# Patient Record
Sex: MALE | Race: BLACK OR AFRICAN AMERICAN | NOT HISPANIC OR LATINO | URBAN - METROPOLITAN AREA
[De-identification: names, ages, dates, MRNs, and addresses within clinical notes are randomized per-mention and may not be internally consistent; named-entity substitution may affect disease eponyms.]

---

## 2021-12-08 ENCOUNTER — RESEARCH ENCOUNTER (OUTPATIENT)
Dept: RESEARCH | Facility: HOSPITAL | Age: 61
End: 2021-12-08

## 2021-12-10 ENCOUNTER — TELEPHONE (OUTPATIENT)
Dept: RESEARCH | Facility: HOSPITAL | Age: 61
End: 2021-12-10

## 2021-12-13 ENCOUNTER — RESEARCH ENCOUNTER (OUTPATIENT)
Dept: RESEARCH | Facility: CLINIC | Age: 61
End: 2021-12-13

## 2021-12-13 DIAGNOSIS — Z23 NEED FOR VACCINATION: Primary | ICD-10-CM

## 2021-12-13 PROCEDURE — 91300 COVID-19, MRNA, LNP-S, PF, 30 MCG/0.3 ML DOSE VACCINE: CPT | Mod: PBBFAC

## 2022-01-13 ENCOUNTER — RESEARCH ENCOUNTER (OUTPATIENT)
Dept: RESEARCH | Facility: HOSPITAL | Age: 62
End: 2022-01-13

## 2022-01-13 NOTE — PROGRESS NOTES
Study title: A Phase 1/2/3. Placebo Controlled, Randomized, Observer-Blind, Dose-Finding Study to Describe the Safety, Tolerability, Immunogenicity and Potential Efficacy of SARS-COV-2 RNA Vaccine Candidates Against COVID-19 in Healthy Adults   IRB #: 2020.198  Sponsor: [employer]   Sponsor's Protocol: [id number]  Site Number: [id number]  Subject ID: [id number]    Deacon Mandujano was contacted via telephone call on 1/13/2022  for their Visit 502, 1 month follow up. The following information was collected from him     Has the patient had any prohibited medications since their last visit? no   Has the patient had any changes in health since their last visit (AE/ZACHARIAH)? no   Has the patient had any non-study vaccinations since their last visit? no    Remind subejct that  their next telephone visit will be their 6 month follow up call (Visit 503)     Deacon Mandujano was informed that their next visit will also be via a telephone call. Subject instructed to continue completing their weekly Illness Diary and contact the site staff or investigator if a medically attend event or hosptialization occures.

## 2022-06-06 ENCOUNTER — TELEPHONE (OUTPATIENT)
Dept: RESEARCH | Facility: HOSPITAL | Age: 62
End: 2022-06-06

## 2022-06-06 NOTE — TELEPHONE ENCOUNTER
Study title: A Phase 1/2/3. Placebo Controlled, Randomized, Observer-Blind, Dose-Finding Study to Describe the Safety, Tolerability, Immunogenicity and Potential Efficacy of SARS-COV-2 RNA Vaccine Candidates Against COVID-19 in Healthy Adults   IRB #: 2020.198  Sponsor: [employer]   Sponsor's Protocol: [id number]  Site Number: [id number]  Subject ID: [id number]      Decaon Mandujano was unsuccessfully contacted via telephone call on 6/6/2022 for their Visit 503, 6 month follow up.     Left voicemail. First attempt

## 2022-06-07 ENCOUNTER — RESEARCH ENCOUNTER (OUTPATIENT)
Dept: RESEARCH | Facility: HOSPITAL | Age: 62
End: 2022-06-07

## 2022-06-07 NOTE — PROGRESS NOTES
Study title: A Phase 1/2/3. Placebo Controlled, Randomized, Observer-Blind, Dose-Finding Study to Describe the Safety, Tolerability, Immunogenicity and Potential Efficacy of SARS-COV-2 RNA Vaccine Candidates Against COVID-19 in Healthy Adults   IRB #: 2020.198  Sponsor: [employer]   Sponsor's Protocol: [id number]  Site Number: [id number]  Subject ID: [id number]      Deacon Mandujano was contacted via telephone call on 6/7/2022 for their Visit 503, 6 month follow up. The following information was collected from him         Has the patient had any prohibited medications since their last visit? no   Has the patient had any major changes in health since their last visit (ZACHARIAH)? no   Has the patient had any non-study vaccinations since their last visit? no      Reminded subject that their next appointment visit will be their 1 year follow up visit (Visit 504)      Deacon Mandujano was informed that their next visit will be an in person visit. Subject instructed to continue completing their weekly Illness Diary and contact the site staff or investigator if a medically attend event or hospitalization occurs.

## 2022-10-19 ENCOUNTER — RESEARCH ENCOUNTER (OUTPATIENT)
Dept: RESEARCH | Facility: HOSPITAL | Age: 62
End: 2022-10-19

## 2022-10-19 NOTE — PROGRESS NOTES
Study title: A Phase 1/2/3. Placebo Controlled, Randomized, Observer-Blind, Dose-Finding Study to Describe the Safety, Tolerability, Immunogenicity and Potential Efficacy of SARS-COV-2 RNA Vaccine Candidates Against COVID-19 in Healthy Adults   IRB #: 2020.198  Sponsor: [employer]   Sponsor's Protocol: [id number]  Site Number: [id number]  Subject ID: [id number]      Site contacted subject to inform subject of study ending per PA20. Subject was informed that the study was ending earlier than planned as the study is no longer testing additional vaccine doses or new variant vaccines on study, and the required safety follow up period has been completed. Subject was informed that from today, 19 OCT 2022, forward, study participation has concluded. Subject expressed understanding and was thanked for their participation in this study.    Subject was informed that Dr. Andreas Hurst replaced Dr. Shalini Fermin as PI of the study. yes      Did the subject have any ongoing AE's no    Did the subject have any ongoing illness symptoms? no    Did the subject have a provisional device? no    Did the subject use a personal device? yes  Subject was instructed to delete Trial Max melany from device? yes      Off Study Date: 19 OCT 2022  Off Study Reason: Follow up Complete per PA20.